# Patient Record
Sex: MALE | Race: WHITE | URBAN - METROPOLITAN AREA
[De-identification: names, ages, dates, MRNs, and addresses within clinical notes are randomized per-mention and may not be internally consistent; named-entity substitution may affect disease eponyms.]

---

## 2019-04-19 ENCOUNTER — OFFICE VISIT (OUTPATIENT)
Dept: URBAN - METROPOLITAN AREA CLINIC 76 | Facility: CLINIC | Age: 74
End: 2019-04-19
Payer: COMMERCIAL

## 2019-04-19 DIAGNOSIS — H25.13 AGE-RELATED NUCLEAR CATARACT, BILATERAL: ICD-10-CM

## 2019-04-19 DIAGNOSIS — H10.45 OTHER CHRONIC ALLERGIC CONJUNCTIVITIS: ICD-10-CM

## 2019-04-19 PROCEDURE — 92014 COMPRE OPH EXAM EST PT 1/>: CPT | Performed by: OPTOMETRIST

## 2019-04-19 PROCEDURE — 92015 DETERMINE REFRACTIVE STATE: CPT | Performed by: OPTOMETRIST

## 2019-04-19 RX ORDER — KETOTIFEN FUMARATE 0.35 MG/ML
SOLUTION/ DROPS OPHTHALMIC
Qty: 15 | Refills: 3 | Status: INACTIVE
Start: 2019-04-19 | End: 2022-01-17

## 2019-04-19 ASSESSMENT — KERATOMETRY
OD: 41.88
OS: 41.88

## 2019-04-19 ASSESSMENT — VISUAL ACUITY
OD: 20/20
OS: 20/25

## 2019-04-19 ASSESSMENT — INTRAOCULAR PRESSURE
OD: 15
OS: 14

## 2019-04-19 NOTE — IMPRESSION/PLAN
Impression: Dry eye syndrome of bilateral lacrimal glands: H04.123. Plan: Discussed chronic nature of condition in detail with patient. Discussed dry eye as cause of fluctuating vision. Recommend Artificial tears four times a day, for continuous maintenance of tear film.

## 2019-04-19 NOTE — IMPRESSION/PLAN
Impression: Other chronic allergic conjunctivitis: H10.45. Plan: Discussed diagnosis with patient. Recommend OTC oral antihistamine, and Ketotifen 1 drop bid ou, prn. Rub excess into lids.

## 2020-09-03 ENCOUNTER — OFFICE VISIT (OUTPATIENT)
Dept: URBAN - METROPOLITAN AREA CLINIC 76 | Facility: CLINIC | Age: 75
End: 2020-09-03
Payer: COMMERCIAL

## 2020-09-03 DIAGNOSIS — H50.34 INTERMITTENT ALTERNATING EXOTROPIA: ICD-10-CM

## 2020-09-03 DIAGNOSIS — H52.4 PRESBYOPIA: Primary | ICD-10-CM

## 2020-09-03 DIAGNOSIS — H04.123 DRY EYE SYNDROME OF BILATERAL LACRIMAL GLANDS: ICD-10-CM

## 2020-09-03 PROCEDURE — 92014 COMPRE OPH EXAM EST PT 1/>: CPT | Performed by: OPTOMETRIST

## 2020-09-03 ASSESSMENT — INTRAOCULAR PRESSURE
OD: 15
OS: 15

## 2020-09-03 ASSESSMENT — KERATOMETRY
OD: 41.88
OS: 41.75

## 2020-09-03 ASSESSMENT — VISUAL ACUITY
OD: 20/20
OS: 20/20

## 2020-09-03 NOTE — IMPRESSION/PLAN
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 Left. Plan: Discussed diagnosis in detail with patient. Will continue to observe condition and or symptoms.

## 2020-09-03 NOTE — IMPRESSION/PLAN
Impression: Dry eye syndrome of bilateral lacrimal glands: H04.123. Plan: Rediscussed chronic nature of condition in detail with patient. Discussed dry eye as cause of fluctuating vision. Recommend Artificial tears four times a day, for continuous maintenance of tear film.

## 2022-01-17 ENCOUNTER — OFFICE VISIT (OUTPATIENT)
Dept: URBAN - METROPOLITAN AREA CLINIC 76 | Facility: CLINIC | Age: 77
End: 2022-01-17
Payer: COMMERCIAL

## 2022-01-17 DIAGNOSIS — H02.889 MEIBOMIAN GLAND DYSFUNCTION OF EYE: ICD-10-CM

## 2022-01-17 PROCEDURE — 92012 INTRM OPH EXAM EST PATIENT: CPT | Performed by: OPTOMETRIST

## 2022-01-17 ASSESSMENT — INTRAOCULAR PRESSURE
OD: 17
OS: 17

## 2022-01-17 ASSESSMENT — VISUAL ACUITY
OD: 20/20
OS: 20/20

## 2022-01-17 ASSESSMENT — KERATOMETRY
OS: 42.13
OD: 41.88

## 2023-07-27 ENCOUNTER — OFFICE VISIT (OUTPATIENT)
Dept: URBAN - METROPOLITAN AREA CLINIC 76 | Facility: CLINIC | Age: 78
End: 2023-07-27
Payer: MEDICARE

## 2023-07-27 DIAGNOSIS — H10.45 OTHER CHRONIC ALLERGIC CONJUNCTIVITIS: ICD-10-CM

## 2023-07-27 DIAGNOSIS — H50.34 INTERMITTENT ALTERNATING EXOTROPIA: ICD-10-CM

## 2023-07-27 DIAGNOSIS — H25.13 AGE-RELATED NUCLEAR CATARACT, BILATERAL: ICD-10-CM

## 2023-07-27 DIAGNOSIS — H02.889 MEIBOMIAN GLAND DYSFUNCTION OF EYE: ICD-10-CM

## 2023-07-27 DIAGNOSIS — H52.4 PRESBYOPIA: Primary | ICD-10-CM

## 2023-07-27 PROCEDURE — 92014 COMPRE OPH EXAM EST PT 1/>: CPT | Performed by: OPTOMETRIST

## 2023-07-27 ASSESSMENT — VISUAL ACUITY
OS: 20/20
OD: 20/20

## 2023-07-27 ASSESSMENT — KERATOMETRY
OD: 42.13
OS: 41.63

## 2023-07-27 ASSESSMENT — INTRAOCULAR PRESSURE
OD: 15
OS: 14

## 2023-10-09 ENCOUNTER — OFFICE VISIT (OUTPATIENT)
Dept: URBAN - METROPOLITAN AREA CLINIC 76 | Facility: CLINIC | Age: 78
End: 2023-10-09
Payer: MEDICARE

## 2023-10-09 DIAGNOSIS — H52.4 PRESBYOPIA: Primary | ICD-10-CM

## 2023-10-09 PROCEDURE — 92015 DETERMINE REFRACTIVE STATE: CPT | Performed by: OPTOMETRIST

## 2023-10-09 ASSESSMENT — KERATOMETRY
OS: 42.00
OD: 42.25